# Patient Record
(demographics unavailable — no encounter records)

---

## 2017-01-01 NOTE — PN
Date/Time of Note


Date/Time of Note


DATE: 17 


TIME: 13:26





[place] SOAP


Subjective Findings


Subjective  findings:  Feeding Well


Other Findings


Breast-feeding well, voided 3 and stooled 4.


Weight today is -5.7% from birthweight.


Passed hearing screen.


GBS negative.





Vital Signs


Vital Signs





 Vital Signs








  Date Time  Temp Pulse Resp B/P Pulse Ox O2 Delivery O2 Flow Rate FiO2


 


17 08:15 97.9 140 52     





NPASS Score-Pain: 0


Weight


Daily Weight:    3693 grams / 8.6  pounds / 9.57  ounces





% weight change from birth -5.790


Intake/Outputs











I & O   


 


 17





 00:59 08:59 16:59


 


Intake Total  6 ml 


 


Balance  6 ml 


 


 Intake Detail   


 


Formula  6 ml 


 


Breastfeeding Duration 25 minutes 25 minutes 





 15 minutes 20 minutes 





  10 minutes 


 


# Voids 1  


 


# Bowel Movements 1  


 


Percent Weight Change from Birth -5.790 %  











Physical Exam


Responsive, pink, comfortable, minimal jaundice


HEENT:  [place] open,soft,flat, Normocephalic


Lungs:  Clear to auscultation


Heart:  Regular R&R, No murmur


Abdomen:  Nl cord, Soft no hepatosplenomegal, No massess


Skin:  No rashes, Juandice (Minimal)


Hip/Extremities:  Nl extremities


Spine:  Normal





Labs/Micro





Laboratory Tests








Test


  17


09:51


 


Total Bilirubin


  7.1mg/dl


(1.5-10.5)


 


Direct Bilirubin


  0.00mg/dl


(0.05-1.20)


 


Indirect Bilirubin


  7.1mg/dl


(0.6-10.5)











Billirubin Risk Assessment


 Age (Hours):  33


[place] Serum Bilirubin:  7.1


Bilirubin Risk Zone:  Low Intermediate Risk





Assessment


Assessment-[place]:  Term, Girl





Plan


Plan is to continue to breast-feed ad jhonny. on demand every 2-3 hours


Monitor weight loss


Congenital heart disease screening and hepatitis B vaccination prior to 

discharge.


Monitor for clinical jaundice.


[place] Condition:  Good











GUDELIA MATTHEWS MD Aug 16, 2017 13:28

## 2017-01-01 NOTE — DS
[facility]


 


 


 


 


 Discharge Summary 


 


Patient Name: Lexii Sims Unit Number: [id number]


YOB: 2017 Patient Status: Admitted Inpatient


Attending Doctor: Geoffrey Curry MD Account Number: [id number]


 


Edit: LETI GARCIA MD on 17 @ 13:09





I have reviewed the history and physical and clinical course on the mother and 

baby and care plan with the nurse practitioner.


Agree with exam, evaluation and treatment plan to continue to encourage breast-

feeding and supplement with formula as needed,


Monitor weight closely, watch for clinical jaundice and follow bilirubin and 

discharge home to be followed by the pediatrician in 2-3 days


________________________________________________________________________________

_____


  


Date/Time of Note


Date/Time of Note


DATE: 17 


TIME: 11:12





[place] SOAP


Subjective Findings


Other Findings


breast and bottle feeding, wgt loss 7.9%





Vital Signs


Vital Signs





 Vital Signs








  Date Time  Temp Pulse Resp B/P Pulse Ox O2 Delivery O2 Flow Rate FiO2


 


17 07:30 98.0 130 42     


 


17 03:51 98.7 136 48     





NPASS Score-Pain: 0





Physical Exam


HEENT:  [place] open,soft,flat, Normocephalic


Lungs:  Clear to auscultation


Heart:  Regular R&R


Abdomen:  Soft, No hepatosplenomegaly, No masses


Skin:  Other (mild jaundice)





Assessment


Term [place]:  Girl


Assessment:  AGA


bilirubin 7.1 at 57 hrs, low risk, wgt loss acceptable





Plan


discharge home with follow up tomorrow with Dr. curry





Condition on Discharge


[place] Condition:  Stable











EMILY SUN NP Aug 17, 2017 11:14

## 2017-01-01 NOTE — PD.NBNDCI
Provider Discharge Instruction


Pediatrician Information


Clinic Information


follow up tomorrow with Dr. curry








Follow-up with Physician:   1





 Week/Weeks











Diet


Breast Feeding Mothers:  Breast Feed Ad LibFormula:  Similac Advance w/EMILY Ghotra NP Aug 17, 2017 11:12

## 2017-01-01 NOTE — HP
Date/Time of Note


Date/Time of Note


DATE: 8/15/17 


TIME: 13:44





[place] Physical Examination


Infant History


YOB: 2017Time of Birth:  0101


Sex:


female


Type of Delivery:  NORMAL VAGINAL DELIVERYBirth Weight (g):  3920Newborn Head 

Circumference:  33.7Length (in):  21.00APGAR Score:  9.9





Maternal Labs


Maternal Hepatitis B:  Negative


Maternal RPR/VDRL:  Nonreactive


Maternal Group Beta Strep:  Negative


Maternal Abx # of Dose(s):  0


Mother's Blood Type:  A Positive





Admission Vital Signs





 Vital Signs








  Date Time  Temp Pulse Resp B/P Pulse Ox O2 Delivery O2 Flow Rate FiO2


 


8/15/17 12:00 98.3 136 50     


 


8/15/17 01:15     96   21











Exam


Fontanels:  Normal


Eyes:  Normal


RR:  Normal


Skull:  Normal


Ears:  Normal


Nose:  Normal


Palate:  Normal


Mouth:  Normal


Neck:  Normal


Respirations:  Normal


Lungs:  Normal


Heart:  Normal


Clavicles:  Normal


Masses:  None


Umbilicus:  Normal


Liver:  Normal


Spleen:  Normal


Kidney:  Normal


Extremeties:  Normal


Hips:  Normal


Skeletal:  Normal


Genitalia:  Normal


Anus:  Patent


Reflexes:  Normal


Skin:  Normal


Meconium Staining:  Normal





Labs/Micro





Laboratory Tests








Test


  8/15/17


11:44


 


Bedside Glucose


  67mg/dL


()











Impression


Diagnosis:  Apparently Normal, Term


Assessment & Plan


Term large for gestational age baby girl.  Accu-Chek 52-67.  Feeding well, 

voiding and stooling plan:





Plan:


Breast-feed every 2-3 hours and at least 8 times over 24hrs


Lactation therapists help the mother to establish breast-feeding


Watch for clinical jaundice and follow bili


Routine  screen and hepatitis B vaccine prior to discharge 


parents baby care and feeding techniques











LETI GARCIA MD Aug 15, 2017 13:46